# Patient Record
Sex: MALE | Race: WHITE | NOT HISPANIC OR LATINO | URBAN - NONMETROPOLITAN AREA
[De-identification: names, ages, dates, MRNs, and addresses within clinical notes are randomized per-mention and may not be internally consistent; named-entity substitution may affect disease eponyms.]

---

## 2019-08-27 ENCOUNTER — IMPORTED ENCOUNTER (OUTPATIENT)
Dept: URBAN - NONMETROPOLITAN AREA CLINIC 1 | Facility: CLINIC | Age: 64
End: 2019-08-27

## 2019-08-27 PROBLEM — H52.4: Noted: 2019-08-27

## 2019-08-27 PROBLEM — H25.813: Noted: 2019-08-27

## 2019-08-27 PROBLEM — H52.223: Noted: 2019-08-27

## 2019-08-27 PROBLEM — H52.03: Noted: 2019-08-27

## 2019-08-27 PROBLEM — H33.301: Noted: 2019-08-27

## 2019-08-27 PROCEDURE — S0620 ROUTINE OPHTHALMOLOGICAL EXA: HCPCS
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
## 2022-04-09 ASSESSMENT — TONOMETRY
OS_IOP_MMHG: 13
OD_IOP_MMHG: 13

## 2022-04-09 ASSESSMENT — VISUAL ACUITY
OU_CC: J1
OD_SC: 20/30
OS_SC: 20/25